# Patient Record
Sex: FEMALE | Race: WHITE | Employment: OTHER | URBAN - NONMETROPOLITAN AREA
[De-identification: names, ages, dates, MRNs, and addresses within clinical notes are randomized per-mention and may not be internally consistent; named-entity substitution may affect disease eponyms.]

---

## 2020-12-17 ENCOUNTER — NURSE ONLY (OUTPATIENT)
Dept: LAB | Age: 75
End: 2020-12-17

## 2022-01-03 ENCOUNTER — PROCEDURE VISIT (OUTPATIENT)
Dept: ENT CLINIC | Age: 77
End: 2022-01-03
Payer: MEDICARE

## 2022-01-03 ENCOUNTER — PREP FOR PROCEDURE (OUTPATIENT)
Dept: ENT CLINIC | Age: 77
End: 2022-01-03

## 2022-01-03 ENCOUNTER — OFFICE VISIT (OUTPATIENT)
Dept: ENT CLINIC | Age: 77
End: 2022-01-03
Payer: MEDICARE

## 2022-01-03 VITALS
OXYGEN SATURATION: 98 % | TEMPERATURE: 98.1 F | WEIGHT: 157 LBS | HEART RATE: 70 BPM | RESPIRATION RATE: 14 BRPM | DIASTOLIC BLOOD PRESSURE: 70 MMHG | SYSTOLIC BLOOD PRESSURE: 138 MMHG

## 2022-01-03 DIAGNOSIS — Z85.3 HISTORY OF BREAST CANCER: ICD-10-CM

## 2022-01-03 DIAGNOSIS — J38.00 VOCAL CORD PARALYSIS: Primary | ICD-10-CM

## 2022-01-03 DIAGNOSIS — Z01.818 PRE-OP TESTING: Primary | ICD-10-CM

## 2022-01-03 DIAGNOSIS — R49.0 PERSISTENT HOARSENESS: ICD-10-CM

## 2022-01-03 PROCEDURE — 99204 OFFICE O/P NEW MOD 45 MIN: CPT | Performed by: PHYSICIAN ASSISTANT

## 2022-01-03 PROCEDURE — 31575 DIAGNOSTIC LARYNGOSCOPY: CPT | Performed by: OTOLARYNGOLOGY

## 2022-01-03 NOTE — LETTER
[address] [facility] and [address]  Phone: [phone]  Fax: [phone]    Fanny Willis MD    January 3, 2022     Thuan Joseph, [address] [place] [address] Road [facility]    Patient: Alexander Richardson   MR Number: [id number]   YOB: 1945   Date of Visit: 1/3/2022       Dear Thuan Joseph: Thank you for referring Nancy Emerson to me for evaluation/treatment. Below are the relevant portions of my assessment and plan of care. If you have questions, please do not hesitate to call me. I look forward to following Aldair Moya along with you.     Sincerely,      Fanny Willis MD
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
## 2022-01-03 NOTE — PROGRESS NOTES
[address] [address]  Otolaryngology Head and Neck Surgery  Dr. Fay Brand MD  Pt Name: Dean Anand  MRN: [id number]  YOB: 1945  Date of evaluation: 1/3/2022  Primary Care Physician: Clotilde Medina MD      Reason for Endoscopy: Distant history of vocal cord paralysis    Type of Endoscopy: Flexible Fiberoptic Nasolaryngoscopy    Anesthesia: Topical Lidocaine after Afrin vasoconstriction    Consent: taken and witnessed        History of Chief Complaint: Persistent hoarseness for several years; variable in quality beginning in 2016. The patient's voice returned apparently to normal, according her  for approximately 1 year and then went back to being very poor quality now for at least the past 2 years. She has been seen at a [facility] ENT practice at least twice and was discharged from the practice when she was found to have a return of her voice quality. At 1 point she was told both vocal cords were paralyzed. She also has a history of left breast carcinoma status postmastectomy. She has had no brain imaging that she is aware of to evaluate her for the possibility of a brain metastasis causing this vocal cord paralysis. She does have a history of cerebrovascular accident associated with an extremely high INR of greater than 11 for a couple of weeks that produced intracranial bleeding and extensive neurological abnormalities. The patient was obtunded aphonic and bedridden for several weeks but apparently fully recovered. This was approximately 2 years ago. No chief complaint on file. Past Medical History   has a past medical history of Acquired hyperlipoproteinemia, Atrial fibrillation ([address] CAD (coronary artery disease), Depression, Fibromyalgia, GERD (gastroesophageal reflux disease), Glucose intolerance (impaired glucose tolerance), Insomnia, Osteoarthritis, Plantar fasciitis, Rheumatoid arthritis(714.0), Tinnitus, and Vertigo.     Past Surgical History   has a past surgical history that includes fracture surgery; Coronary artery bypass graft (2005); Coronary artery bypass graft (2007); Hysterectomy; Appendectomy; and Pacemaker insertion. Medications  Current Medications:   Current Outpatient Medications   Medication Sig Dispense Refill    digoxin (LANOXIN) 0.125 MG tablet Take 125 mcg by mouth daily.  metoprolol (TOPROL-XL) 50 MG XL tablet Take 50 mg by mouth 2 times daily.  warfarin (COUMADIN) 5 MG tablet Take 5 mg by mouth Daily.  fish oil-omega-3 fatty acids 1000 MG capsule Take 2 g by mouth daily.  Calcium Citrate-Vitamin D 200-200 MG-UNIT TABS Take  by mouth daily.  aspirin 81 MG chewable tablet Take 81 mg by mouth daily. No current facility-administered medications for this visit. Home Medications:   Prior to Admission medications    Medication Sig Start Date End Date Taking? Authorizing Provider   digoxin (LANOXIN) 0.125 MG tablet Take 125 mcg by mouth daily. Historical Provider, MD   metoprolol (TOPROL-XL) 50 MG XL tablet Take 50 mg by mouth 2 times daily. Historical Provider, MD   warfarin (COUMADIN) 5 MG tablet Take 5 mg by mouth Daily. Historical Provider, MD   fish oil-omega-3 fatty acids 1000 MG capsule Take 2 g by mouth daily. Historical Provider, MD   Calcium Citrate-Vitamin D 200-200 MG-UNIT TABS Take  by mouth daily. Historical Provider, MD   aspirin 81 MG chewable tablet Take 81 mg by mouth daily. Historical Provider, MD       Allergies  Codeine, Pcn [penicillins], and Skelaxin [metaxalone]    Family History  family history is not on file. Social History  Tobacco use:  reports that she has never smoked. She has never used smokeless tobacco.  Alcohol use:  reports no history of alcohol use. Drug use:  has no history on file for drug use. Findings:   1.   Left true vocal fold paralysis in the paramedian position; true vocal fold hypoplastic and bowed with no rotatory motion seen whatsoever  2. Right true vocal fold fully mobile and capable of crossing the midline with laryngeal closure occurring at the false fold level to produce a poor quality voice  3. Arytenoid towers are symmetrically vertical  4. No pooling in either piriform sinuses  5. No laryngomalacia on deep breathing    Quiet breathing  Left piriform sinus      Whispered phonation    Quiet breathing    Effortful voice    Deep breathing          IMPRESSIONS:  1. A persistent left true vocal fold paralysis producing hoarseness but not dysphagia or upper airway obstructio   2. Etiology of left true vocal fold likely idiopathic but given the patient's breast cancer history, a CNS cause for this condition needs to be ruled out. PLAN:  1. Discussed with the patient the various treatment options available for unilateral vocal fold paralysis including a same-day surgery procedure that would involve the injection of calcium hydroxyapatite known as Prolaryn versus a definitive reconstruction of a Silastic implantation which can also be performed transorally. 2.  The patient needs a head CT with contrast to look for any signs of metastatic disease from her breast cancer that might explain her symptoms as well. Even though this is a longstanding problem in terms of hoarseness, there could be more than 1 factor contributing to her condition. Once CNS disease can be ruled out, we can proceed to the operating room for temporary or permanent reconstructive care to improve her voice significantly. I reviewed the indications benefits limitations and risks of proceeding in this manner with the patient and her  to their satisfaction. The risks include bleeding, given that she is on anticoagulants, a short clinical benefit from a temporary injection and a continued need for more definitive treatment such as in the Silastic implantation among others.   The patient and her  reported understanding these other risks and wished to proceed though she described herself as being \"scared\".             Nicole Bolden MD   Electronically signed 1/3/2022 at 5:08 PM

## 2022-01-11 ENCOUNTER — NURSE ONLY (OUTPATIENT)
Dept: LAB | Age: 77
End: 2022-01-11

## 2022-01-11 DIAGNOSIS — Z01.818 PRE-OP TESTING: ICD-10-CM

## 2022-01-11 DIAGNOSIS — J38.00 VOCAL CORD PARALYSIS: ICD-10-CM

## 2022-01-11 DIAGNOSIS — Z85.3 HISTORY OF BREAST CANCER: ICD-10-CM

## 2022-01-11 LAB
ALBUMIN SERPL-MCNC: 3.9 G/DL (ref 3.5–5.1)
ALP BLD-CCNC: 107 U/L (ref 38–126)
ALT SERPL-CCNC: 15 U/L (ref 11–66)
ANION GAP SERPL CALCULATED.3IONS-SCNC: 10 MEQ/L (ref 8–16)
AST SERPL-CCNC: 16 U/L (ref 5–40)
BASOPHILS # BLD: 0.6 %
BASOPHILS ABSOLUTE: 0.1 THOU/MM3 (ref 0–0.1)
BILIRUB SERPL-MCNC: 0.5 MG/DL (ref 0.3–1.2)
BUN BLDV-MCNC: 14 MG/DL (ref 7–22)
CALCIUM SERPL-MCNC: 10.3 MG/DL (ref 8.5–10.5)
CHLORIDE BLD-SCNC: 98 MEQ/L (ref 98–111)
CO2: 31 MEQ/L (ref 23–33)
CREAT SERPL-MCNC: 1.3 MG/DL (ref 0.4–1.2)
EOSINOPHIL # BLD: 2 %
EOSINOPHILS ABSOLUTE: 0.2 THOU/MM3 (ref 0–0.4)
ERYTHROCYTE [DISTWIDTH] IN BLOOD BY AUTOMATED COUNT: 14.4 % (ref 11.5–14.5)
ERYTHROCYTE [DISTWIDTH] IN BLOOD BY AUTOMATED COUNT: 50.9 FL (ref 35–45)
GFR SERPL CREATININE-BSD FRML MDRD: 40 ML/MIN/1.73M2
GLUCOSE BLD-MCNC: 135 MG/DL (ref 70–108)
HCT VFR BLD CALC: 43.1 % (ref 37–47)
HEMOGLOBIN: 13.5 GM/DL (ref 12–16)
IMMATURE GRANS (ABS): 0.04 THOU/MM3 (ref 0–0.07)
IMMATURE GRANULOCYTES: 0.4 %
LYMPHOCYTES # BLD: 25.2 %
LYMPHOCYTES ABSOLUTE: 2.8 THOU/MM3 (ref 1–4.8)
MCH RBC QN AUTO: 30.2 PG (ref 26–33)
MCHC RBC AUTO-ENTMCNC: 31.3 GM/DL (ref 32.2–35.5)
MCV RBC AUTO: 96.4 FL (ref 81–99)
MONOCYTES # BLD: 8.8 %
MONOCYTES ABSOLUTE: 1 THOU/MM3 (ref 0.4–1.3)
NUCLEATED RED BLOOD CELLS: 0 /100 WBC
PLATELET # BLD: 349 THOU/MM3 (ref 130–400)
PMV BLD AUTO: 10.7 FL (ref 9.4–12.4)
POTASSIUM SERPL-SCNC: 4.2 MEQ/L (ref 3.5–5.2)
RBC # BLD: 4.47 MILL/MM3 (ref 4.2–5.4)
SEG NEUTROPHILS: 63 %
SEGMENTED NEUTROPHILS ABSOLUTE COUNT: 6.9 THOU/MM3 (ref 1.8–7.7)
SODIUM BLD-SCNC: 139 MEQ/L (ref 135–145)
TOTAL PROTEIN: 7 G/DL (ref 6.1–8)
WBC # BLD: 11 THOU/MM3 (ref 4.8–10.8)

## 2022-01-13 ENCOUNTER — TELEPHONE (OUTPATIENT)
Dept: ENT CLINIC | Age: 77
End: 2022-01-13

## 2022-01-17 ENCOUNTER — HOSPITAL ENCOUNTER (OUTPATIENT)
Age: 77
Discharge: HOME | End: 2022-01-17
Payer: MEDICARE

## 2022-01-17 ENCOUNTER — HOSPITAL ENCOUNTER (OUTPATIENT)
Dept: GENERAL RADIOLOGY | Age: 77
Discharge: HOME | End: 2022-01-17
Payer: MEDICARE

## 2022-01-17 DIAGNOSIS — Z01.818 PRE-OP TESTING: ICD-10-CM

## 2022-01-17 LAB
EKG ATRIAL RATE: 61 BPM
EKG Q-T INTERVAL: 432 MS
EKG QRS DURATION: 162 MS
EKG QTC CALCULATION (BAZETT): 466 MS
EKG R AXIS: -72 DEGREES
EKG T AXIS: 105 DEGREES
EKG VENTRICULAR RATE: 70 BPM

## 2022-01-17 PROCEDURE — 93010 ELECTROCARDIOGRAM REPORT: CPT | Performed by: NUCLEAR MEDICINE

## 2022-01-17 PROCEDURE — 71046 X-RAY EXAM CHEST 2 VIEWS: CPT

## 2022-01-17 PROCEDURE — 93005 ELECTROCARDIOGRAM TRACING: CPT | Performed by: OTOLARYNGOLOGY

## 2022-01-19 ENCOUNTER — HOSPITAL ENCOUNTER (OUTPATIENT)
Dept: CT IMAGING | Age: 77
Discharge: HOME | End: 2022-01-19
Payer: MEDICARE

## 2022-01-19 DIAGNOSIS — Z85.3 HISTORY OF BREAST CANCER: ICD-10-CM

## 2022-01-19 DIAGNOSIS — J38.00 VOCAL CORD PARALYSIS: ICD-10-CM

## 2022-01-19 PROCEDURE — 70470 CT HEAD/BRAIN W/O & W/DYE: CPT

## 2022-01-19 PROCEDURE — 6360000004 HC RX CONTRAST MEDICATION: Performed by: PHYSICIAN ASSISTANT

## 2022-01-19 RX ADMIN — IOPAMIDOL 65 ML: 755 INJECTION, SOLUTION INTRAVENOUS at 14:11

## 2022-01-27 NOTE — PROGRESS NOTES
PAT call attempted, patient unavailable, left message to please call us back at your earliest convenience; [phone]

## 2022-02-01 RX ORDER — SODIUM CHLORIDE 9 MG/ML
25 INJECTION, SOLUTION INTRAVENOUS PRN
Status: CANCELLED | OUTPATIENT
Start: 2022-02-01

## 2022-02-01 RX ORDER — SODIUM CHLORIDE 0.9 % (FLUSH) 0.9 %
5-40 SYRINGE (ML) INJECTION EVERY 12 HOURS SCHEDULED
Status: CANCELLED | OUTPATIENT
Start: 2022-02-01

## 2022-02-01 RX ORDER — SODIUM CHLORIDE 0.9 % (FLUSH) 0.9 %
5-40 SYRINGE (ML) INJECTION PRN
Status: CANCELLED | OUTPATIENT
Start: 2022-02-01

## 2022-02-01 RX ORDER — DEXAMETHASONE SODIUM PHOSPHATE 4 MG/ML
4 INJECTION, SOLUTION INTRA-ARTICULAR; INTRALESIONAL; INTRAMUSCULAR; INTRAVENOUS; SOFT TISSUE
Status: CANCELLED | OUTPATIENT
Start: 2022-02-01

## 2022-02-03 ENCOUNTER — HOSPITAL ENCOUNTER (OUTPATIENT)
Age: 77
Setting detail: OUTPATIENT SURGERY
Discharge: HOME | End: 2022-02-03
Attending: OTOLARYNGOLOGY | Admitting: OTOLARYNGOLOGY
Payer: MEDICARE

## 2022-02-03 ENCOUNTER — ANESTHESIA (OUTPATIENT)
Dept: OPERATING ROOM | Age: 77
End: 2022-02-03
Payer: MEDICARE

## 2022-02-03 ENCOUNTER — ANESTHESIA EVENT (OUTPATIENT)
Dept: OPERATING ROOM | Age: 77
End: 2022-02-03
Payer: MEDICARE

## 2022-02-03 ENCOUNTER — APPOINTMENT (OUTPATIENT)
Dept: GENERAL RADIOLOGY | Age: 77
End: 2022-02-03
Attending: OTOLARYNGOLOGY
Payer: MEDICARE

## 2022-02-03 VITALS
OXYGEN SATURATION: 94 % | BODY MASS INDEX: 25.52 KG/M2 | RESPIRATION RATE: 16 BRPM | HEIGHT: 66 IN | SYSTOLIC BLOOD PRESSURE: 113 MMHG | DIASTOLIC BLOOD PRESSURE: 60 MMHG | TEMPERATURE: 97.5 F | HEART RATE: 71 BPM | WEIGHT: 158.8 LBS

## 2022-02-03 VITALS — DIASTOLIC BLOOD PRESSURE: 80 MMHG | OXYGEN SATURATION: 100 % | SYSTOLIC BLOOD PRESSURE: 158 MMHG

## 2022-02-03 PROCEDURE — 6360000002 HC RX W HCPCS

## 2022-02-03 PROCEDURE — 31571 LARYNGOSCOP W/VC INJ + SCOPE: CPT | Performed by: OTOLARYNGOLOGY

## 2022-02-03 PROCEDURE — 2709999900 HC NON-CHARGEABLE SUPPLY: Performed by: OTOLARYNGOLOGY

## 2022-02-03 PROCEDURE — 3600000004 HC SURGERY LEVEL 4 BASE: Performed by: OTOLARYNGOLOGY

## 2022-02-03 PROCEDURE — 3600000014 HC SURGERY LEVEL 4 ADDTL 15MIN: Performed by: OTOLARYNGOLOGY

## 2022-02-03 PROCEDURE — 7100000000 HC PACU RECOVERY - FIRST 15 MIN: Performed by: OTOLARYNGOLOGY

## 2022-02-03 PROCEDURE — L8607 INJ VOCAL CORD BULKING AGENT: HCPCS | Performed by: OTOLARYNGOLOGY

## 2022-02-03 PROCEDURE — 2500000003 HC RX 250 WO HCPCS

## 2022-02-03 PROCEDURE — 7100000011 HC PHASE II RECOVERY - ADDTL 15 MIN: Performed by: OTOLARYNGOLOGY

## 2022-02-03 PROCEDURE — 3700000001 HC ADD 15 MINUTES (ANESTHESIA): Performed by: OTOLARYNGOLOGY

## 2022-02-03 PROCEDURE — 3700000000 HC ANESTHESIA ATTENDED CARE: Performed by: OTOLARYNGOLOGY

## 2022-02-03 PROCEDURE — 6360000002 HC RX W HCPCS: Performed by: ANESTHESIOLOGY

## 2022-02-03 PROCEDURE — 2500000003 HC RX 250 WO HCPCS: Performed by: NURSE ANESTHETIST, CERTIFIED REGISTERED

## 2022-02-03 PROCEDURE — 2580000003 HC RX 258: Performed by: OTOLARYNGOLOGY

## 2022-02-03 PROCEDURE — 6360000002 HC RX W HCPCS: Performed by: NURSE ANESTHETIST, CERTIFIED REGISTERED

## 2022-02-03 PROCEDURE — 7100000001 HC PACU RECOVERY - ADDTL 15 MIN: Performed by: OTOLARYNGOLOGY

## 2022-02-03 PROCEDURE — 6370000000 HC RX 637 (ALT 250 FOR IP): Performed by: OTOLARYNGOLOGY

## 2022-02-03 PROCEDURE — 71045 X-RAY EXAM CHEST 1 VIEW: CPT

## 2022-02-03 PROCEDURE — 7100000010 HC PHASE II RECOVERY - FIRST 15 MIN: Performed by: OTOLARYNGOLOGY

## 2022-02-03 DEVICE — PROLARYN PLUS IS A WATER BASED INJECTABLE GEL IMPLANT USED TO TREAT VOCAL FOLD INSUFFICIENCY. THE PRINCIPLE COMPONENT OF PROLARYN PLUS IS SYNTHETIC CALCIUM HYDROXYAPATITE, A BIOMATERIAL FOUND IN BONE AND TEETH. INJECTION WITH PROLARYN PLUS AUGMENTS OR BULKS UPS THE DISPLACED OR DAMAGED VOCAL FOLD SO THAT IT CAN IMPROVE SPEAKING. THE RESULT IS LONG TERM RESTORATION AND AUGMENTATION. PROLARYN PLUS CAN BE INJECTED WITH A 26 GAUGE OR LARGER DIAMETER THIN-WALL-NEEDLE.
Type: IMPLANTABLE DEVICE | Site: THROAT | Status: FUNCTIONAL
Brand: PROLARYN PLUS INJECTABLE IMPLANT

## 2022-02-03 RX ORDER — LABETALOL 20 MG/4 ML (5 MG/ML) INTRAVENOUS SYRINGE
10 EVERY 10 MIN PRN
Status: DISCONTINUED | OUTPATIENT
Start: 2022-02-03 | End: 2022-02-03 | Stop reason: HOSPADM

## 2022-02-03 RX ORDER — SODIUM CHLORIDE 0.9 % (FLUSH) 0.9 %
5-40 SYRINGE (ML) INJECTION PRN
Status: DISCONTINUED | OUTPATIENT
Start: 2022-02-03 | End: 2022-02-03 | Stop reason: HOSPADM

## 2022-02-03 RX ORDER — FENTANYL CITRATE 50 UG/ML
INJECTION, SOLUTION INTRAMUSCULAR; INTRAVENOUS PRN
Status: DISCONTINUED | OUTPATIENT
Start: 2022-02-03 | End: 2022-02-03 | Stop reason: SDUPTHER

## 2022-02-03 RX ORDER — PROPOFOL 10 MG/ML
INJECTION, EMULSION INTRAVENOUS PRN
Status: DISCONTINUED | OUTPATIENT
Start: 2022-02-03 | End: 2022-02-03 | Stop reason: SDUPTHER

## 2022-02-03 RX ORDER — MIRTAZAPINE 15 MG/1
15 TABLET, FILM COATED ORAL NIGHTLY
COMMUNITY
Start: 2022-02-03

## 2022-02-03 RX ORDER — DEXAMETHASONE SODIUM PHOSPHATE 4 MG/ML
4 INJECTION, SOLUTION INTRA-ARTICULAR; INTRALESIONAL; INTRAMUSCULAR; INTRAVENOUS; SOFT TISSUE
Status: DISCONTINUED | OUTPATIENT
Start: 2022-02-03 | End: 2022-02-03 | Stop reason: HOSPADM

## 2022-02-03 RX ORDER — SODIUM CHLORIDE 9 MG/ML
25 INJECTION, SOLUTION INTRAVENOUS PRN
Status: DISCONTINUED | OUTPATIENT
Start: 2022-02-03 | End: 2022-02-03 | Stop reason: HOSPADM

## 2022-02-03 RX ORDER — FENTANYL CITRATE 50 UG/ML
25 INJECTION, SOLUTION INTRAMUSCULAR; INTRAVENOUS EVERY 5 MIN PRN
Status: DISCONTINUED | OUTPATIENT
Start: 2022-02-03 | End: 2022-02-03 | Stop reason: HOSPADM

## 2022-02-03 RX ORDER — PROPOFOL 10 MG/ML
INJECTION, EMULSION INTRAVENOUS CONTINUOUS PRN
Status: DISCONTINUED | OUTPATIENT
Start: 2022-02-03 | End: 2022-02-03 | Stop reason: SDUPTHER

## 2022-02-03 RX ORDER — HYDROCODONE BITARTRATE AND ACETAMINOPHEN 5; 325 MG/1; MG/1
1 TABLET ORAL ONCE
Status: COMPLETED | OUTPATIENT
Start: 2022-02-03 | End: 2022-02-03

## 2022-02-03 RX ORDER — FENTANYL CITRATE 50 UG/ML
50 INJECTION, SOLUTION INTRAMUSCULAR; INTRAVENOUS EVERY 5 MIN PRN
Status: DISCONTINUED | OUTPATIENT
Start: 2022-02-03 | End: 2022-02-03 | Stop reason: HOSPADM

## 2022-02-03 RX ORDER — POTASSIUM CHLORIDE 1.5 G/1.77G
20 POWDER, FOR SOLUTION ORAL DAILY
COMMUNITY

## 2022-02-03 RX ORDER — GLIMEPIRIDE 1 MG/1
1 TABLET ORAL
COMMUNITY

## 2022-02-03 RX ORDER — LIDOCAINE HYDROCHLORIDE 20 MG/ML
INJECTION, SOLUTION INTRAVENOUS PRN
Status: DISCONTINUED | OUTPATIENT
Start: 2022-02-03 | End: 2022-02-03 | Stop reason: SDUPTHER

## 2022-02-03 RX ORDER — FUROSEMIDE 20 MG/1
20 TABLET ORAL DAILY
COMMUNITY

## 2022-02-03 RX ORDER — SODIUM CHLORIDE 0.9 % (FLUSH) 0.9 %
5-40 SYRINGE (ML) INJECTION EVERY 12 HOURS SCHEDULED
Status: DISCONTINUED | OUTPATIENT
Start: 2022-02-03 | End: 2022-02-03 | Stop reason: HOSPADM

## 2022-02-03 RX ORDER — PROMETHAZINE HYDROCHLORIDE 25 MG/ML
12.5 INJECTION, SOLUTION INTRAMUSCULAR; INTRAVENOUS
Status: DISCONTINUED | OUTPATIENT
Start: 2022-02-03 | End: 2022-02-03 | Stop reason: HOSPADM

## 2022-02-03 RX ORDER — LABETALOL 20 MG/4 ML (5 MG/ML) INTRAVENOUS SYRINGE
Status: COMPLETED
Start: 2022-02-03 | End: 2022-02-03

## 2022-02-03 RX ORDER — ATORVASTATIN CALCIUM 20 MG/1
20 TABLET, FILM COATED ORAL DAILY
COMMUNITY

## 2022-02-03 RX ORDER — ROCURONIUM BROMIDE 10 MG/ML
INJECTION, SOLUTION INTRAVENOUS PRN
Status: DISCONTINUED | OUTPATIENT
Start: 2022-02-03 | End: 2022-02-03 | Stop reason: SDUPTHER

## 2022-02-03 RX ORDER — GABAPENTIN 300 MG/1
300 CAPSULE ORAL 3 TIMES DAILY
COMMUNITY

## 2022-02-03 RX ORDER — FENTANYL CITRATE 50 UG/ML
INJECTION, SOLUTION INTRAMUSCULAR; INTRAVENOUS
Status: COMPLETED
Start: 2022-02-03 | End: 2022-02-03

## 2022-02-03 RX ADMIN — LABETALOL 20 MG/4 ML (5 MG/ML) INTRAVENOUS SYRINGE 10 MG: at 11:59

## 2022-02-03 RX ADMIN — FENTANYL CITRATE 50 MCG: 50 INJECTION, SOLUTION INTRAMUSCULAR; INTRAVENOUS at 12:04

## 2022-02-03 RX ADMIN — LIDOCAINE HYDROCHLORIDE 100 MG: 20 INJECTION, SOLUTION INTRAVENOUS at 10:21

## 2022-02-03 RX ADMIN — HYDROCODONE BITARTRATE AND ACETAMINOPHEN 1 TABLET: 5; 325 TABLET ORAL at 13:25

## 2022-02-03 RX ADMIN — SUGAMMADEX 100 MG: 100 INJECTION, SOLUTION INTRAVENOUS at 11:03

## 2022-02-03 RX ADMIN — FENTANYL CITRATE 50 MCG: 50 INJECTION, SOLUTION INTRAMUSCULAR; INTRAVENOUS at 11:33

## 2022-02-03 RX ADMIN — PROPOFOL 100 MG: 10 INJECTION, EMULSION INTRAVENOUS at 10:21

## 2022-02-03 RX ADMIN — FENTANYL CITRATE 50 MCG: 50 INJECTION, SOLUTION INTRAMUSCULAR; INTRAVENOUS at 10:43

## 2022-02-03 RX ADMIN — SODIUM CHLORIDE: 9 INJECTION, SOLUTION INTRAVENOUS at 10:20

## 2022-02-03 RX ADMIN — FENTANYL CITRATE 100 MCG: 50 INJECTION, SOLUTION INTRAMUSCULAR; INTRAVENOUS at 10:21

## 2022-02-03 RX ADMIN — SUGAMMADEX 200 MG: 100 INJECTION, SOLUTION INTRAVENOUS at 10:57

## 2022-02-03 RX ADMIN — ROCURONIUM BROMIDE 20 MG: 10 INJECTION INTRAVENOUS at 10:45

## 2022-02-03 RX ADMIN — ROCURONIUM BROMIDE 30 MG: 10 INJECTION INTRAVENOUS at 10:21

## 2022-02-03 RX ADMIN — PROPOFOL 100 MCG/KG/MIN: 10 INJECTION, EMULSION INTRAVENOUS at 10:21

## 2022-02-03 ASSESSMENT — PULMONARY FUNCTION TESTS
PIF_VALUE: 18
PIF_VALUE: 1
PIF_VALUE: 2
PIF_VALUE: 1
PIF_VALUE: 0
PIF_VALUE: 1
PIF_VALUE: 21
PIF_VALUE: 19
PIF_VALUE: 2
PIF_VALUE: 1
PIF_VALUE: 20
PIF_VALUE: 1
PIF_VALUE: 18
PIF_VALUE: 2
PIF_VALUE: 18
PIF_VALUE: 1
PIF_VALUE: 19
PIF_VALUE: 2
PIF_VALUE: 1
PIF_VALUE: 21
PIF_VALUE: 0
PIF_VALUE: 4
PIF_VALUE: 0
PIF_VALUE: 19
PIF_VALUE: 0
PIF_VALUE: 2
PIF_VALUE: 0
PIF_VALUE: 1
PIF_VALUE: 22
PIF_VALUE: 20
PIF_VALUE: 0
PIF_VALUE: 1
PIF_VALUE: 18
PIF_VALUE: 18
PIF_VALUE: 0
PIF_VALUE: 2
PIF_VALUE: 21
PIF_VALUE: 20
PIF_VALUE: 1
PIF_VALUE: 0
PIF_VALUE: 0
PIF_VALUE: 18
PIF_VALUE: 2
PIF_VALUE: 19
PIF_VALUE: 0
PIF_VALUE: 23
PIF_VALUE: 23
PIF_VALUE: 2
PIF_VALUE: 20
PIF_VALUE: 1
PIF_VALUE: 0
PIF_VALUE: 1
PIF_VALUE: 1
PIF_VALUE: 0
PIF_VALUE: 19

## 2022-02-03 ASSESSMENT — PAIN SCALES - GENERAL
PAINLEVEL_OUTOF10: 5
PAINLEVEL_OUTOF10: 5
PAINLEVEL_OUTOF10: 4
PAINLEVEL_OUTOF10: 7
PAINLEVEL_OUTOF10: 5
PAINLEVEL_OUTOF10: 3
PAINLEVEL_OUTOF10: 7

## 2022-02-03 ASSESSMENT — PAIN DESCRIPTION - DESCRIPTORS
DESCRIPTORS: SORE

## 2022-02-03 ASSESSMENT — PAIN DESCRIPTION - LOCATION
LOCATION: THROAT

## 2022-02-03 ASSESSMENT — PAIN DESCRIPTION - PAIN TYPE
TYPE: SURGICAL PAIN

## 2022-02-03 ASSESSMENT — PAIN - FUNCTIONAL ASSESSMENT: PAIN_FUNCTIONAL_ASSESSMENT: 0-10

## 2022-02-03 NOTE — PROGRESS NOTES
1109- Pt arrived to PACU alert with no s/s of distress upon arrival.    1120- Dr. Alis Mcmanus at bedside to evaluate Pt and update her on how surgery went. Verbal order received for STAT CXR to r/o pneumothorax. 1125- Radiologist at bedside and CXR complete at this time. [facility] notified of elevated BP with SBPs in the 180-200 range. Decreased to 170-180s following administration on PRN Fentanyl for pain. Order received for PRN labetolol. Administered per order. Will continue to monitor BP and HR.    1216- /73 at this time. 1228- Pt transferred to SD at this time in stable condition per RN.
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
## 2022-02-03 NOTE — BRIEF OP NOTE
Brief Postoperative Note      Patient: Ralph Aguilar  YOB: 1945  MRN: [id number]    Date of Procedure: 2/3/2022    Pre-Op Diagnosis: HOARSENESS, VOCAL CORD PARALYSIS    Post-Op Diagnosis: Same       Procedure(s):  SUPENSION LARYNGOSCOPY WITH PROLARYN INJECTION INTO LEFT VOCAL CORD    Surgeon(s):  Kelvin Zuñiga MD    Assistant:  * No surgical staff found *    Anesthesia: General    Estimated Blood Loss (mL): Minimal    Complications: None    Specimens:   * No specimens in log *    Implants:  Implant Name Type Inv. Item Serial No.  Lot No. LRB No. Used Action   IMPLANT LARYN 1ML GEL INJ VOCAL CRD MEDIALIZATION PROLARYN  IMPLANT LARYN 1ML GEL INJ VOCAL CRD MEDIALIZATION [address] [id number] Left 1 Implanted         Drains: * No LDAs found *    Findings: 1. 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  0.2 cc in two locations instilled into left true vocal fold, posterior and central  3.  PATOS: Left maxillary canine and two incisors were loose at start of case    Electronically signed by Kelvin Zuñiga MD on 2/3/2022 at 11:21 AM

## 2022-02-03 NOTE — PROGRESS NOTES

## 2022-02-03 NOTE — ANESTHESIA POSTPROCEDURE EVALUATION
Department of Anesthesiology  Postprocedure Note    Patient: Dean Anand  MRN: [id number]  YOB: 1945  Date of evaluation: 2/3/2022  Time:  1:10 PM     Procedure Summary     Date: 02/03/22 Room / Location: [address] [facility] [place]    Anesthesia Start: 7404 Anesthesia Stop: 1114    Procedure: [address] INJECTION INTO LEFT VOCAL CORD (Left ) Diagnosis: (HOARSENESS, VOCAL CORD PARALYSIS)    Surgeons: Fay Brand MD Responsible Provider: Juan José Najera DO    Anesthesia Type: general ASA Status: 3          Anesthesia Type: general    Yvon Phase I: Yvon Score: 9    Yvon Phase II:      Last vitals: Reviewed and per EMR flowsheets.        Anesthesia Post Evaluation    Patient location during evaluation: bedside  Patient participation: complete - patient participated  Level of consciousness: awake  Airway patency: patent  Nausea & Vomiting: no vomiting and no nausea  Cardiovascular status: hemodynamically stable  Respiratory status: acceptable  Hydration status: stable
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
## 2022-02-03 NOTE — OP NOTE
Operative Note      Patient: Miladys Downs  YOB: 1945  MRN: [id number]    Date of Procedure: 2/3/2022    Pre-Op Diagnosis: HOARSENESS, VOCAL CORD PARALYSIS    Post-Op Diagnosis: Same       Procedure(s):  [address] INJECTION INTO LEFT VOCAL CORD    Surgeon(s):  Flora Landon MD    Assistant:   * No surgical staff found *    Anesthesia: General    Estimated Blood Loss (mL): Minimal    Complications: None    Specimens:   * No specimens in log *    Implants:  Implant Name Type Inv. Item Serial No.  Lot No. LRB No. Used Action   IMPLANT LARYN 1ML GEL INJ VOCAL CRD MEDIALIZATION PROLARYN  IMPLANT LARYN 1ML GEL INJ VOCAL CRD MEDIALIZATION [address] [id number] Left 1 Implanted         Drains: * No LDAs found *    Findings: 1. 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  0.2 cc in two locations instilled into left true vocal fold, posterior and central  3. PATOS: Left maxillary canine and two incisors were loose at start of case    Suspension laryngoscopy with jet ventilation catheter pointing into airway      Surgical viewed with hypoplastic left cord      Prolaryn injector in position      Same      After 0.2 cc x 2 injected into the left cord prior to smoothing        Detailed Description of Procedure: The patient was taken to the operating room awake and placed in the supine position. An IV line was started. General anesthesia was induced and the patient was intubated with a #6 endotracheal tube in the first attempt without vital sign instability. The table was turned 90 degrees for the procedure. I performed a timeout verifying the patient's identity and planned procedure. Preparing for the laryngoscopy I placed heat activated plastic over her maxillary dentition noted to protect her dentition for the laryngoscopy.   It was at this point that I discovered that her left maxillary canine and two incisors were loose. The dental protector placed across the maxilla preventing any displacement of these teeth during the laryngoscopy. I passed the Roseanna into the patient's vallecula but could not lift the epiglottis which had been temporarily prolapsed into the airway but reduced with direct visualization. Instead I picked up the epiglottis with the Roseanna and could then achieve a direct line of sight view of the airway. I used a 30 degree optical telescope to visualize the airway and surrounding structures. Extubating the patient I commenced jet ventilation through the transoral system for the remainder of the case. 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 No other lesions or masses were detected. I brought on the field a sterile syringe with calcium hydroxyapatite 1.0 cc and used the product specific needle in order to instilling the airway. I placed 0.2 cc posterior laterally and an additional 0.2 cc centrally to vocal cord and take it from a concavity to convexity. I smoothed this material down with saline cottonoid on an alligator forceps as well as a laryngeal spatula. A small amount had extravasated into the subglottis so I vented this with an alligator forcep and with the laryngeal spatula, with which I expressed in the lingula and suctioned it away. As such until the operation concluded. I reintubated the patient with a #6 endotracheal tube again, without difficulty and remove the transoral hardware. I turned the patient back to anesthesia for reversal extubation in the operating room. This was carried out without incident and the patient was taken to recovery in satisfactory condition. Estimated blood loss in the case was minimal and the patient was given less than half a liter of intravenous lactated Ringer's intraoperatively.   No blood pressure transfused. No intraoperative complications were detected. Counts were considered accurate x3. I was present for and performed the patient's entire operation myself.     Electronically signed by Sascha Rodrigues MD on 2/3/2022 at 11:29 AM

## 2022-02-21 ENCOUNTER — PROCEDURE VISIT (OUTPATIENT)
Dept: ENT CLINIC | Age: 77
End: 2022-02-21
Payer: MEDICARE

## 2022-02-21 ENCOUNTER — OFFICE VISIT (OUTPATIENT)
Dept: ENT CLINIC | Age: 77
End: 2022-02-21
Payer: MEDICARE

## 2022-02-21 VITALS
WEIGHT: 157 LBS | HEART RATE: 70 BPM | DIASTOLIC BLOOD PRESSURE: 60 MMHG | OXYGEN SATURATION: 93 % | TEMPERATURE: 97.3 F | BODY MASS INDEX: 25.34 KG/M2 | RESPIRATION RATE: 14 BRPM | SYSTOLIC BLOOD PRESSURE: 126 MMHG

## 2022-02-21 DIAGNOSIS — J38.00 VOCAL CORD PARALYSIS: Primary | ICD-10-CM

## 2022-02-21 DIAGNOSIS — R49.0 PERSISTENT HOARSENESS: ICD-10-CM

## 2022-02-21 PROCEDURE — 31575 DIAGNOSTIC LARYNGOSCOPY: CPT | Performed by: OTOLARYNGOLOGY

## 2022-02-21 PROCEDURE — 99213 OFFICE O/P EST LOW 20 MIN: CPT | Performed by: NURSE PRACTITIONER

## 2022-02-21 NOTE — PROGRESS NOTES
[address] [address]  Otolaryngology Head and Neck Surgery  Dr. Pelon Chandra MD  Pt Name: Norma Stallings  MRN: [id number]  YOB: 1945  Date of evaluation: 2/21/2022  Primary Care Physician: BHARGAV Hein - CNP      Reason for Endoscopy: As follow-up of true vocal fold soft tissue augmentation for vocal fold paralysis and hoarseness    Type of Endoscopy: Flexible Fiberoptic Nasolaryngoscopy    Anesthesia: Topical Lidocaine after Afrin vasoconstriction    Consent: taken and witnessed        History of Chief Complaint: Post calcium hydroxyapatite injection of vocal cord paralysis. No chief complaint on file. The patient is 2 weeks status post suspension laryngoscopy and soft tissue augmentation of her paralyzed left true vocal fold with preoperative marked laryngeal insufficiency and minimal voice. The patient is here today with her  and reports a remarkable improvement in her voice quality and the ability to be heard by her  even across the house. Something that would have previously been impossible for her. Past Medical History   has a past medical history of Acquired hyperlipoproteinemia, Atrial fibrillation ([address] CAD (coronary artery disease), Cancer ([address] Depression, Fibromyalgia, GERD (gastroesophageal reflux disease), Glucose intolerance (impaired glucose tolerance), Insomnia, Osteoarthritis, Plantar fasciitis, Rheumatoid arthritis(714.0), Tinnitus, and Vertigo. Past Surgical History   has a past surgical history that includes fracture surgery; Coronary artery bypass graft (2005); Coronary artery bypass graft (2007); Hysterectomy; Appendectomy; Pacemaker insertion; pacemaker placement; and laryngoscopy (Left, 2/3/2022).     Medications  Current Medications:   Current Outpatient Medications   Medication Sig Dispense Refill    mirtazapine (REMERON) 15 MG tablet Take 15 mg by mouth nightly      dilTIAZem (CARDIZEM) 30 MG tablet Take 30 mg by mouth 4 times daily      gabapentin (NEURONTIN) 300 MG capsule Take 300 mg by mouth 3 times daily.  atorvastatin (LIPITOR) 20 MG tablet Take 20 mg by mouth daily      glimepiride (AMARYL) 1 MG tablet Take 1 mg by mouth every morning (before breakfast)      furosemide (LASIX) 20 MG tablet Take 20 mg by mouth daily      potassium chloride (KLOR-CON) 20 MEQ packet Take 20 mEq by mouth daily      digoxin (LANOXIN) 0.125 MG tablet Take 125 mcg by mouth daily.  metoprolol (TOPROL-XL) 50 MG XL tablet Take 50 mg by mouth 2 times daily.  warfarin (COUMADIN) 5 MG tablet Take 5 mg by mouth Daily.  fish oil-omega-3 fatty acids 1000 MG capsule Take 2 g by mouth daily.  Calcium Citrate-Vitamin D 200-200 MG-UNIT TABS Take  by mouth daily.  aspirin 81 MG chewable tablet Take 81 mg by mouth daily. No current facility-administered medications for this visit. Home Medications:   Prior to Admission medications    Medication Sig Start Date End Date Taking? Authorizing Provider   mirtazapine (REMERON) 15 MG tablet Take 15 mg by mouth nightly 2/3/22   Historical Provider, MD   dilTIAZem (CARDIZEM) 30 MG tablet Take 30 mg by mouth 4 times daily    Historical Provider, MD   gabapentin (NEURONTIN) 300 MG capsule Take 300 mg by mouth 3 times daily. Historical Provider, MD   atorvastatin (LIPITOR) 20 MG tablet Take 20 mg by mouth daily    Historical Provider, MD   glimepiride (AMARYL) 1 MG tablet Take 1 mg by mouth every morning (before breakfast)    Historical Provider, MD   furosemide (LASIX) 20 MG tablet Take 20 mg by mouth daily    Historical Provider, MD   potassium chloride (KLOR-CON) 20 MEQ packet Take 20 mEq by mouth daily    Historical Provider, MD   digoxin (LANOXIN) 0.125 MG tablet Take 125 mcg by mouth daily. Historical Provider, MD   metoprolol (TOPROL-XL) 50 MG XL tablet Take 50 mg by mouth 2 times daily.     Historical Provider, MD   warfarin (COUMADIN) 5 MG tablet Take 5 mg by mouth Daily. Historical Provider, MD   fish oil-omega-3 fatty acids 1000 MG capsule Take 2 g by mouth daily. Historical Provider, MD   Calcium Citrate-Vitamin D 200-200 MG-UNIT TABS Take  by mouth daily. Historical Provider, MD   aspirin 81 MG chewable tablet Take 81 mg by mouth daily. Historical Provider, MD       Allergies  Codeine, Pcn [penicillins], and Skelaxin [metaxalone]    Family History  family history is not on file. Social History  Tobacco use:  reports that she has never smoked. She has never used smokeless tobacco.  Alcohol use:  reports no history of alcohol use. Drug use:  reports no history of drug use. Findings:   The patient's larynx was abnormal for the presence of a left-sided immobile paramedian vocal fold with normal bulk. Her right side was capable of crossing the midline and making full flush contact with the left side with mild hyperfunctional adduction of the false vocal fold as part of a compensatory structure. The left side had a small \"dot\" of CaHA where the injection had been placed but no SLP extravasation beyond that spot could be seen. There was no signs of stiffness. The anterior commissure was sharp. The patient tolerated the endoscopy well. Breathing with blue light imaging; small dot of where Prolaryn was injected visible      Partial adduction      Full adduction; normal white light            IMPRESSIONS:  1. Excellent early voice recovery post Prolaryn injection of paralyzed left true vocal fold  2. Likely the last several months to well over a year     PLAN:  1. Recommend follow-up in approximately 3 months for an interval exam and likely flexible laryngoscopy  2.   If voice improvement is short-lived on the scale of 6 months before it begins to decay, I will likely recommend against a second injection and for something more definitive such as a transoral laryngoplasty with Silastic implantation in the form of an Ishiiki Type I procedure. If on the other hand, it lasts upwards of 2 years, a repeat injection would be very much indicated. I explained all this in detail to the patient and her  who was with us the entire time to their satisfaction. They understand the binary nature of the possible next steps recognized that we will review these options in more detail once we have information as to how long this will likely benefit her. They reported being pleased with the outcome of the visit and being willing to proceed as such.             Fay Brand MD   Electronically signed 2/21/2022 at 3:48 PM
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
